# Patient Record
Sex: FEMALE | Race: BLACK OR AFRICAN AMERICAN | NOT HISPANIC OR LATINO | URBAN - METROPOLITAN AREA
[De-identification: names, ages, dates, MRNs, and addresses within clinical notes are randomized per-mention and may not be internally consistent; named-entity substitution may affect disease eponyms.]

---

## 2022-04-04 ENCOUNTER — HOSPITAL ENCOUNTER (OUTPATIENT)
Dept: BEHAVIORAL HEALTH | Age: 27
End: 2022-04-04

## 2022-04-04 ASSESSMENT — COGNITIVE AND FUNCTIONAL STATUS - GENERAL
PERCEPTUAL_MISINTERPRETATIONS_HALLUCINATIONS: VISUAL
MOOD: ANXIOUS
RELIABILITY: APPEARS TO BE TRUTHFUL/RELIABLE
BEHAVIOR: UNABLE TO ASSESS
AFFECT: UNABLE TO ASSESS
COGNITIVE_PROCESS: RACING
ATTENTION_CALCULATED: MAINTAINS ATTENTION
MEMORY: INTACT
INSIGHT: FAIR
ORIENTATION: ORIENTED (PERSON/PLACE/TIME)
LEVEL_OF_CONSCIOUSNESS_CALCULATED: ALERT
MOTOR_BEHAVIOR-AGITATION_CALCULATED: UNABLE TO ASSESS
COGNITIVE_CONTENT: PERSERVATION/RUMINATION
SPEECH: CLEAR/UNDERSTANDABLE
JUDGEMENT: FAIR

## 2022-04-04 ASSESSMENT — LIFESTYLE VARIABLES
ALCOHOL_USE: YES
ALCOHOL_ROUTE: PO
ALCOHOL_USE_STATUS: UNHEALTHY DRINKING IDENTIFIED. AUDIT C: 3 OR MORE FOR WOMEN AND 4 OR MORE FOR MEN.
HOW MANY STANDARD DRINKS CONTAINING ALCOHOL DO YOU HAVE ON A TYPICAL DAY: 7 TO 9
ALCOHOL_TYPE: HARD LIQUOR;WINE
HOW OFTEN DO YOU HAVE 6 OR MORE DRINKS ON ONE OCCASION: DAILY OR ALMOST DAILY
HOW OFTEN DO YOU HAVE A DRINK CONTAINING ALCOHOL: 4 OR MORE TIMES PER WEEK
AUDIT-C TOTAL SCORE: 11

## 2022-04-08 ENCOUNTER — TELEPHONE (OUTPATIENT)
Dept: CASE MANAGEMENT | Age: 27
End: 2022-04-08

## 2022-09-06 ENCOUNTER — OFFICE VISIT (OUTPATIENT)
Dept: OCCUPATIONAL MEDICINE | Age: 27
End: 2022-09-06

## 2022-09-06 DIAGNOSIS — Z11.1 SCREENING FOR TUBERCULOSIS: Primary | ICD-10-CM

## 2022-09-06 DIAGNOSIS — Z02.89 VISIT FOR OCCUPATIONAL HEALTH EXAMINATION: ICD-10-CM

## 2022-09-06 PROCEDURE — 86580 TB INTRADERMAL TEST: CPT | Performed by: PREVENTIVE MEDICINE

## 2022-09-09 ENCOUNTER — OFFICE VISIT (OUTPATIENT)
Dept: OCCUPATIONAL MEDICINE | Age: 27
End: 2022-09-09

## 2022-09-09 DIAGNOSIS — Z00.8 HEALTH EXAMINATION IN POPULATION SURVEY: Primary | ICD-10-CM

## 2022-09-09 LAB
INDURATION: 0 MM (ref 0–?)
SKIN TEST RESULT: NEGATIVE

## 2023-09-17 ENCOUNTER — HOSPITAL ENCOUNTER (EMERGENCY)
Facility: HOSPITAL | Age: 28
Discharge: HOME | End: 2023-09-17

## 2023-09-17 VITALS
SYSTOLIC BLOOD PRESSURE: 112 MMHG | HEART RATE: 69 BPM | BODY MASS INDEX: 21.66 KG/M2 | OXYGEN SATURATION: 99 % | DIASTOLIC BLOOD PRESSURE: 77 MMHG | TEMPERATURE: 98 F | RESPIRATION RATE: 16 BRPM | HEIGHT: 65 IN | WEIGHT: 130 LBS

## 2023-09-17 DIAGNOSIS — J01.00 ACUTE MAXILLARY SINUSITIS, RECURRENCE NOT SPECIFIED: Primary | ICD-10-CM

## 2023-09-17 LAB
FLUAV AG UPPER RESP QL IA.RAPID: NEGATIVE
FLUBV AG UPPER RESP QL IA.RAPID: NEGATIVE
SARS-COV+SARS-COV-2 AG RESP QL IA.RAPID: NEGATIVE

## 2023-09-17 PROCEDURE — 99284 EMERGENCY DEPT VISIT MOD MDM: CPT | Mod: ,,, | Performed by: NURSE PRACTITIONER

## 2023-09-17 PROCEDURE — 99284 PR EMERGENCY DEPT VISIT,LEVEL IV: ICD-10-PCS | Mod: ,,, | Performed by: NURSE PRACTITIONER

## 2023-09-17 PROCEDURE — 87804 INFLUENZA ASSAY W/OPTIC: CPT | Performed by: NURSE PRACTITIONER

## 2023-09-17 PROCEDURE — 99284 EMERGENCY DEPT VISIT MOD MDM: CPT

## 2023-09-17 PROCEDURE — 87426 SARSCOV CORONAVIRUS AG IA: CPT | Performed by: NURSE PRACTITIONER

## 2023-09-17 RX ORDER — FLUTICASONE PROPIONATE 50 MCG
1 SPRAY, SUSPENSION (ML) NASAL 2 TIMES DAILY PRN
Qty: 15 G | Refills: 0 | Status: SHIPPED | OUTPATIENT
Start: 2023-09-17 | End: 2023-12-18

## 2023-09-17 RX ORDER — AMOXICILLIN AND CLAVULANATE POTASSIUM 875; 125 MG/1; MG/1
1 TABLET, FILM COATED ORAL 2 TIMES DAILY
Qty: 20 TABLET | Refills: 0 | Status: SHIPPED | OUTPATIENT
Start: 2023-09-17 | End: 2023-12-18

## 2023-09-17 NOTE — ED PROVIDER NOTES
Encounter Date: 2023       History     Chief Complaint   Patient presents with    Nasal Congestion    Generalized Body Aches    Sore Throat     1 week     29 y/o AAM presents pov with c/o sinus congestion, sore throat, and body aches with onset of 5 days.      Review of patient's allergies indicates:  No Known Allergies  History reviewed. No pertinent past medical history.  Past Surgical History:   Procedure Laterality Date     SECTION       History reviewed. No pertinent family history.  Social History     Tobacco Use    Smoking status: Never    Smokeless tobacco: Never   Substance Use Topics    Alcohol use: Never    Drug use: Never     Review of Systems   Constitutional:  Positive for chills. Negative for fever.   HENT:  Positive for congestion and sinus pressure.    Respiratory:  Negative for apnea, cough, choking, chest tightness, shortness of breath, wheezing and stridor.    Cardiovascular:  Negative for chest pain, palpitations and leg swelling.   Neurological:  Negative for dizziness and headaches.   All other systems reviewed and are negative.      Physical Exam     Initial Vitals [23 1839]   BP Pulse Resp Temp SpO2   112/77 69 16 98.4 °F (36.9 °C) 99 %      MAP       --         Physical Exam    Nursing note and vitals reviewed.  Constitutional: She appears well-developed and well-nourished. No distress.   HENT:   Head: Normocephalic.   Right Ear: Tympanic membrane and ear canal normal.   Left Ear: Tympanic membrane and ear canal normal.   Nose: Mucosal edema present. Right sinus exhibits maxillary sinus tenderness. Right sinus exhibits no frontal sinus tenderness. Left sinus exhibits maxillary sinus tenderness. Left sinus exhibits no frontal sinus tenderness.   Mouth/Throat: Uvula is midline, oropharynx is clear and moist and mucous membranes are normal.   Eyes: Conjunctivae and EOM are normal.   Neck: Neck supple.   Normal range of motion.  Cardiovascular:  Normal rate, regular rhythm,  normal heart sounds and intact distal pulses.     Exam reveals no gallop and no friction rub.       No murmur heard.  Pulmonary/Chest: Breath sounds normal. No respiratory distress. She has no wheezes. She has no rhonchi. She has no rales. She exhibits no tenderness.   Abdominal: Abdomen is soft. Bowel sounds are normal.   Musculoskeletal:         General: Normal range of motion.      Cervical back: Normal range of motion and neck supple.     Neurological: She is alert and oriented to person, place, and time. She has normal strength.   Skin: Skin is warm and dry. Capillary refill takes less than 2 seconds.   Psychiatric: She has a normal mood and affect. Her behavior is normal. Judgment and thought content normal.         Medical Screening Exam   See Full Note    ED Course   Procedures  Labs Reviewed   RAPID INFLUENZA A/B - Normal   SARS ANTIGEN(SANTO) - Normal    Narrative:     Negative SARS-CoV results should not be used as the sole basis for treatment or patient management decisions; negative results should be considered in the context of a patient's recent exposures, history and the presene of clinical signs and symptoms consistent with COVID-19.  Negative results should be treated as presumptive and confirmed by molecular assay, if necessary for patient management.          Imaging Results    None          Medications - No data to display  Medical Decision Making  29 y/o AAM presents pov with c/o sinus congestion, sore throat, and body aches with onset of 5 days.    Amount and/or Complexity of Data Reviewed  Labs: ordered.     Details: Covid: Negative  Flu: Negative                               Clinical Impression:   Final diagnoses:  [J01.00] Acute maxillary sinusitis, recurrence not specified (Primary)        ED Disposition Condition    Discharge Stable          ED Prescriptions       Medication Sig Dispense Start Date End Date Auth. Provider    fluticasone propionate (FLONASE) 50 mcg/actuation nasal spray 1  spray (50 mcg total) by Each Nostril route 2 (two) times daily as needed for Rhinitis. 15 g 9/17/2023 -- Theo Berman FNP    amoxicillin-clavulanate 875-125mg (AUGMENTIN) 875-125 mg per tablet Take 1 tablet by mouth 2 (two) times daily. 20 tablet 9/17/2023 -- Theo Berman FNP          Follow-up Information    None          Theo Berman FNP  09/17/23 9838

## 2023-09-18 NOTE — ED NOTES
Patient discharged to home via private vehicle with discharge instructions and voiced understanding.

## 2023-09-19 ENCOUNTER — HOSPITAL ENCOUNTER (EMERGENCY)
Facility: HOSPITAL | Age: 28
Discharge: HOME | End: 2023-09-19

## 2023-09-19 VITALS
HEART RATE: 89 BPM | SYSTOLIC BLOOD PRESSURE: 102 MMHG | HEIGHT: 65 IN | TEMPERATURE: 98 F | WEIGHT: 131 LBS | BODY MASS INDEX: 21.83 KG/M2 | OXYGEN SATURATION: 98 % | DIASTOLIC BLOOD PRESSURE: 66 MMHG | RESPIRATION RATE: 16 BRPM

## 2023-09-19 DIAGNOSIS — J01.00 ACUTE MAXILLARY SINUSITIS, RECURRENCE NOT SPECIFIED: Primary | ICD-10-CM

## 2023-09-19 PROCEDURE — 99283 PR EMERGENCY DEPT VISIT,LEVEL III: ICD-10-PCS | Mod: ,,, | Performed by: NURSE PRACTITIONER

## 2023-09-19 PROCEDURE — 99283 EMERGENCY DEPT VISIT LOW MDM: CPT | Mod: ,,, | Performed by: NURSE PRACTITIONER

## 2023-09-19 PROCEDURE — 99281 EMR DPT VST MAYX REQ PHY/QHP: CPT

## 2023-09-19 RX ORDER — DEXAMETHASONE SODIUM PHOSPHATE 4 MG/ML
8 INJECTION, SOLUTION INTRA-ARTICULAR; INTRALESIONAL; INTRAMUSCULAR; INTRAVENOUS; SOFT TISSUE
Status: DISCONTINUED | OUTPATIENT
Start: 2023-09-19 | End: 2023-09-19 | Stop reason: HOSPADM

## 2023-09-19 NOTE — ED PROVIDER NOTES
Encounter Date: 2023       History     Chief Complaint   Patient presents with    Nasal Congestion    Cough     27 yo AAF seen 2 days ago for sinus infection. Was unable to afford antibiotics until today so she just started them. 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 Declined Decadron injection.     The history is provided by the patient.     Review of patient's allergies indicates:  No Known Allergies  History reviewed. No pertinent past medical history.  Past Surgical History:   Procedure Laterality Date     SECTION       History reviewed. No pertinent family history.  Social History     Tobacco Use    Smoking status: Never    Smokeless tobacco: Never   Substance Use Topics    Alcohol use: Never    Drug use: Never     Review of Systems   Constitutional:  Negative for appetite change, chills and fever.   HENT:  Positive for congestion, sinus pressure and sinus pain. Negative for sneezing and sore throat.    Respiratory:  Negative for cough, shortness of breath and wheezing.    Cardiovascular:  Negative for chest pain, palpitations and leg swelling.   Gastrointestinal:  Negative for abdominal pain, nausea and vomiting.   Musculoskeletal:  Negative for back pain, neck pain and neck stiffness.   Neurological:  Positive for headaches. Negative for dizziness and weakness.   Psychiatric/Behavioral:  Positive for confusion.    All other systems reviewed and are negative.      Physical Exam     Initial Vitals [23 1847]   BP Pulse Resp Temp SpO2   102/66 89 16 98.4 °F (36.9 °C) 98 %      MAP       --         Physical Exam    Nursing note and vitals reviewed.  Constitutional: She appears well-developed and well-nourished.   HENT:   Head: Normocephalic and atraumatic.   Right Ear: Hearing, tympanic membrane, external ear and ear canal normal.   Left Ear: Hearing, tympanic membrane, external ear and ear canal normal.   Nose: Mucosal edema and sinus tenderness present. Right sinus exhibits  maxillary sinus tenderness. Left sinus exhibits maxillary sinus tenderness.   Eyes: EOM are normal. Pupils are equal, round, and reactive to light.   Neck: Neck supple.   Normal range of motion.  Cardiovascular:  Normal rate, regular rhythm and normal heart sounds.           Pulmonary/Chest: Breath sounds normal.   Abdominal: Abdomen is soft. Bowel sounds are normal.   Musculoskeletal:         General: Normal range of motion.      Cervical back: Normal range of motion and neck supple.     Neurological: She is alert and oriented to person, place, and time. She has normal strength. GCS score is 15. GCS eye subscore is 4. GCS verbal subscore is 5. GCS motor subscore is 6.   Skin: Skin is warm. Capillary refill takes less than 2 seconds.   Psychiatric: She has a normal mood and affect. Her behavior is normal. Judgment and thought content normal.         Medical Screening Exam   See Full Note    ED Course   Procedures  Labs Reviewed - No data to display       Imaging Results    None          Medications   dexAMETHasone injection 8 mg (8 mg Intramuscular Not Given 9/19/23 1900)     Medical Decision Making  27 yo AAF seen 2 days ago for sinus infection. Was unable to afford antibiotics until today so she just started them. 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 Declined Decadron injection.     Patient instructed to continue antibiotics.   Vitals reviewed: /66, T98.4, P89    Strict return and follow-up precautions have been given by me personally to the patient/family/caregiver(s).    Data Reviewed/Counseling: I have reviewed the patient's vital signs, nursing notes, and other relevant tests/information. I had a detailed discussion regarding the historical points, exam findings, and any diagnostic results supporting the discharge diagnosis. I also discussed the need for outpatient follow-up and the need to return to the ED if symptoms worsen or if there are any questions or concerns that arise at home.         Risk  Prescription drug management.                               Clinical Impression:   Final diagnoses:  [J01.00] Acute maxillary sinusitis, recurrence not specified (Primary)        ED Disposition Condition    Discharge Stable          ED Prescriptions    None       Follow-up Information    None          Regina Dotson, SHILO  09/19/23 4532

## 2023-09-19 NOTE — DISCHARGE INSTRUCTIONS
Take medication as prescribed. Drink plenty of fluids. Follow up with your primary care provider as needed. Return to the ER for worsening condition.     The examination and treatment you have received in the Emergency Department today have been rendered on an emergency basis only and are not intended to be a substitute for an effort to provide complete medical care. You should contact your follow-up physician as it is important that you let him or her check you and report any new or remaining problems since it is impossible to recognize and treat all elements of an injury or illness in a single emergency care center visit.

## 2023-09-19 NOTE — Clinical Note
"Lucinda Slaterle" Odin was seen and treated in our emergency department on 9/19/2023.  She may return to work on 09/21/2023.       If you have any questions or concerns, please don't hesitate to call.      Regina Dotson, SHILO"

## 2023-11-19 ENCOUNTER — HOSPITAL ENCOUNTER (EMERGENCY)
Facility: HOSPITAL | Age: 28
Discharge: HOME | End: 2023-11-19

## 2023-11-19 VITALS
BODY MASS INDEX: 22.72 KG/M2 | HEART RATE: 77 BPM | RESPIRATION RATE: 16 BRPM | WEIGHT: 136.38 LBS | SYSTOLIC BLOOD PRESSURE: 109 MMHG | TEMPERATURE: 99 F | OXYGEN SATURATION: 97 % | HEIGHT: 65 IN | DIASTOLIC BLOOD PRESSURE: 62 MMHG

## 2023-11-19 DIAGNOSIS — R07.9 CHEST PAIN: ICD-10-CM

## 2023-11-19 DIAGNOSIS — J10.1 INFLUENZA B: Primary | ICD-10-CM

## 2023-11-19 DIAGNOSIS — F19.10 SUBSTANCE ABUSE: ICD-10-CM

## 2023-11-19 LAB
ALBUMIN SERPL BCP-MCNC: 3.8 G/DL (ref 3.5–5)
ALBUMIN/GLOB SERPL: 0.9 {RATIO}
ALP SERPL-CCNC: 48 U/L (ref 37–98)
ALT SERPL W P-5'-P-CCNC: 27 U/L (ref 13–56)
ANION GAP SERPL CALCULATED.3IONS-SCNC: 17 MMOL/L (ref 7–16)
AST SERPL W P-5'-P-CCNC: 22 U/L (ref 15–37)
BASOPHILS # BLD AUTO: 0.02 K/UL (ref 0–0.2)
BASOPHILS NFR BLD AUTO: 0.5 % (ref 0–1)
BILIRUB SERPL-MCNC: 0.1 MG/DL (ref ?–1.2)
BUN SERPL-MCNC: 13 MG/DL (ref 7–18)
BUN/CREAT SERPL: 21 (ref 6–20)
CALCIUM SERPL-MCNC: 8.7 MG/DL (ref 8.5–10.1)
CHLORIDE SERPL-SCNC: 101 MMOL/L (ref 98–107)
CO2 SERPL-SCNC: 25 MMOL/L (ref 21–32)
CREAT SERPL-MCNC: 0.62 MG/DL (ref 0.55–1.02)
DIFFERENTIAL METHOD BLD: ABNORMAL
EGFR (NO RACE VARIABLE) (RUSH/TITUS): 125 ML/MIN/1.73M2
EOSINOPHIL # BLD AUTO: 0.04 K/UL (ref 0–0.5)
EOSINOPHIL NFR BLD AUTO: 0.9 % (ref 1–4)
ERYTHROCYTE [DISTWIDTH] IN BLOOD BY AUTOMATED COUNT: 12.1 % (ref 11.5–14.5)
FLUAV AG UPPER RESP QL IA.RAPID: NEGATIVE
FLUBV AG UPPER RESP QL IA.RAPID: POSITIVE
GLOBULIN SER-MCNC: 4.1 G/DL (ref 2–4)
GLUCOSE SERPL-MCNC: 92 MG/DL (ref 74–106)
HCT VFR BLD AUTO: 33.6 % (ref 38–47)
HGB BLD-MCNC: 11.1 G/DL (ref 12–16)
LACTATE SERPL-SCNC: 1.6 MMOL/L (ref 0.4–2)
LYMPHOCYTES # BLD AUTO: 1.57 K/UL (ref 1–4.8)
LYMPHOCYTES NFR BLD AUTO: 36.9 % (ref 27–41)
MAGNESIUM SERPL-MCNC: 1.7 MG/DL (ref 1.7–2.3)
MCH RBC QN AUTO: 30.5 PG (ref 27–31)
MCHC RBC AUTO-ENTMCNC: 33 G/DL (ref 32–36)
MCV RBC AUTO: 92.3 FL (ref 80–96)
MONOCYTES # BLD AUTO: 0.33 K/UL (ref 0–0.8)
MONOCYTES NFR BLD AUTO: 7.7 % (ref 2–6)
MPC BLD CALC-MCNC: 9.6 FL (ref 9.4–12.4)
NEUTROPHILS # BLD AUTO: 2.3 K/UL (ref 1.8–7.7)
NEUTROPHILS NFR BLD AUTO: 54 % (ref 53–65)
PLATELET # BLD AUTO: 216 K/UL (ref 150–400)
POTASSIUM SERPL-SCNC: 3.5 MMOL/L (ref 3.5–5.1)
PROT SERPL-MCNC: 7.9 G/DL (ref 6.4–8.2)
RBC # BLD AUTO: 3.64 M/UL (ref 4.2–5.4)
SARS-COV-2 RDRP RESP QL NAA+PROBE: NEGATIVE
SODIUM SERPL-SCNC: 139 MMOL/L (ref 136–145)
TROPONIN I SERPL DL<=0.01 NG/ML-MCNC: <4 PG/ML
WBC # BLD AUTO: 4.26 K/UL (ref 4.5–11)

## 2023-11-19 PROCEDURE — 96374 THER/PROPH/DIAG INJ IV PUSH: CPT

## 2023-11-19 PROCEDURE — 93005 ELECTROCARDIOGRAM TRACING: CPT

## 2023-11-19 PROCEDURE — 99284 PR EMERGENCY DEPT VISIT,LEVEL IV: ICD-10-PCS | Mod: ,,,

## 2023-11-19 PROCEDURE — 63600175 PHARM REV CODE 636 W HCPCS

## 2023-11-19 PROCEDURE — 85025 COMPLETE CBC W/AUTO DIFF WBC: CPT

## 2023-11-19 PROCEDURE — 93010 ELECTROCARDIOGRAM REPORT: CPT | Mod: ,,, | Performed by: INTERNAL MEDICINE

## 2023-11-19 PROCEDURE — 83735 ASSAY OF MAGNESIUM: CPT

## 2023-11-19 PROCEDURE — 80053 COMPREHEN METABOLIC PANEL: CPT

## 2023-11-19 PROCEDURE — 87804 INFLUENZA ASSAY W/OPTIC: CPT

## 2023-11-19 PROCEDURE — 99285 EMERGENCY DEPT VISIT HI MDM: CPT | Mod: 25

## 2023-11-19 PROCEDURE — 87635 SARS-COV-2 COVID-19 AMP PRB: CPT

## 2023-11-19 PROCEDURE — 99284 EMERGENCY DEPT VISIT MOD MDM: CPT | Mod: ,,,

## 2023-11-19 PROCEDURE — 84484 ASSAY OF TROPONIN QUANT: CPT

## 2023-11-19 PROCEDURE — 83605 ASSAY OF LACTIC ACID: CPT

## 2023-11-19 PROCEDURE — 93010 EKG 12-LEAD: ICD-10-PCS | Mod: ,,, | Performed by: INTERNAL MEDICINE

## 2023-11-19 RX ORDER — ONDANSETRON 2 MG/ML
4 INJECTION INTRAMUSCULAR; INTRAVENOUS
Status: COMPLETED | OUTPATIENT
Start: 2023-11-19 | End: 2023-11-19

## 2023-11-19 RX ADMIN — ONDANSETRON 4 MG: 2 INJECTION INTRAMUSCULAR; INTRAVENOUS at 08:11

## 2023-11-19 NOTE — Clinical Note
"Lucinda Slaterle" Odin was seen and treated in our emergency department on 11/19/2023.  She may return to work on 11/20/2023.       If you have any questions or concerns, please don't hesitate to call.      Feng Mendoza, SHILO"

## 2023-11-20 NOTE — ED PROVIDER NOTES
"Encounter Date: 2023       History     Chief Complaint   Patient presents with    Cough     Pt states, snorted cocaine last night for the first time, pt was to embarrassed to say anything. Chest hurts on lt side.       Patient is a 28-year-old  female with no significant past medical history presents to the emergency department POV with complaint of chest pain.  Patient stated that her chest pain started this evening while patient was lying sown to go to sleep. Patient endorsed cocaine usage last night, stated that it was her first time. Patient denies any radiation of pain, chest pain is not reproducible upon physical exam. Patient does endorse nausea without vomiting.       Chest Pain  The current episode started 2 to 3 hours ago. Chest pain occurs constantly. The chest pain is unchanged. The pain is associated with breathing. At its most intense, the chest pain is at 6/10. The chest pain is currently at 6/10. The quality of the pain is described as aching and dull. The pain does not radiate. Primary symptoms include cough and nausea. Pertinent negatives for primary symptoms include no fever, no fatigue, no syncope, no shortness of breath, no wheezing, no palpitations, no abdominal pain, no vomiting, no dizziness and no altered mental status.     Review of patient's allergies indicates:  No Known Allergies  Past Medical History:   Diagnosis Date    Fibroids      Past Surgical History:   Procedure Laterality Date     SECTION       History reviewed. No pertinent family history.  Social History     Tobacco Use    Smoking status: Every Day     Types: Cigarettes, Vaping with nicotine    Smokeless tobacco: Current   Substance Use Topics    Alcohol use: Yes     Comment: socially    Drug use: Yes     Frequency: 1.0 times per week     Types: "Crack" cocaine     Comment: started last night     Review of Systems   Constitutional: Negative.  Negative for fatigue and fever.   Eyes: Negative.  "   Respiratory:  Positive for cough. Negative for shortness of breath and wheezing.    Cardiovascular:  Positive for chest pain. Negative for palpitations and syncope.   Gastrointestinal:  Positive for nausea. Negative for abdominal pain and vomiting.   Endocrine: Negative.    Genitourinary: Negative.    Musculoskeletal: Negative.    Skin: Negative.    Allergic/Immunologic: Negative.    Neurological: Negative.  Negative for dizziness.   Hematological: Negative.    Psychiatric/Behavioral: Negative.         Physical Exam     Initial Vitals [11/19/23 2037]   BP Pulse Resp Temp SpO2   131/77 95 18 98.5 °F (36.9 °C) 97 %      MAP       --         Physical Exam    Nursing note and vitals reviewed.  Constitutional: Vital signs are normal. She appears well-developed and well-nourished. She is not diaphoretic. She is cooperative.  Non-toxic appearance. She does not have a sickly appearance. She does not appear ill. No distress.   Cardiovascular:  Normal rate, regular rhythm, S1 normal, S2 normal, normal heart sounds, intact distal pulses and normal pulses.     Exam reveals no gallop, no S3, no S4, no distant heart sounds and no friction rub.       No murmur heard.  No systolic murmur is present.  No diastolic murmur is present.  Pulmonary/Chest: Effort normal and breath sounds normal.   Abdominal: Abdomen is soft and flat. Bowel sounds are normal. There is no abdominal tenderness. No hernia.     Lymphadenopathy:     She has no cervical adenopathy.     She has no axillary adenopathy.   Neurological: She is alert and oriented to person, place, and time. She has normal strength and normal reflexes. She displays normal reflexes. No cranial nerve deficit or sensory deficit. She displays a negative Romberg sign. GCS eye subscore is 4. GCS verbal subscore is 5. GCS motor subscore is 6.   Skin: Skin is warm, dry and intact. Capillary refill takes less than 2 seconds. No rash noted.   Psychiatric: She has a normal mood and affect. Her  speech is normal and behavior is normal. Judgment and thought content normal. She is not actively hallucinating. Cognition and memory are normal. She is attentive.         Medical Screening Exam   See Full Note    ED Course   Procedures  Labs Reviewed   RAPID INFLUENZA A/B - Abnormal; Notable for the following components:       Result Value    Influenza B Positive (*)     All other components within normal limits   COMPREHENSIVE METABOLIC PANEL - Abnormal; Notable for the following components:    Anion Gap 17 (*)     BUN/Creatinine Ratio 21 (*)     Globulin 4.1 (*)     All other components within normal limits   CBC WITH DIFFERENTIAL - Abnormal; Notable for the following components:    WBC 4.26 (*)     RBC 3.64 (*)     Hemoglobin 11.1 (*)     Hematocrit 33.6 (*)     Monocytes % 7.7 (*)     Eosinophils % 0.9 (*)     All other components within normal limits   SARS-COV-2 RNA AMPLIFICATION, QUAL - Normal    Narrative:     Negative SARS-CoV results should not be used as the sole basis for treatment or patient management decisions; negative results should be considered in the context of a patient's recent exposures, history and the presene of clinical signs and symptoms consistent with COVID-19.  Negative results should be treated as presumptive and confirmed by molecular assay, if necessary for patient management.   TROPONIN I - Normal   MAGNESIUM - Normal   LACTIC ACID, PLASMA - Normal   CBC W/ AUTO DIFFERENTIAL    Narrative:     The following orders were created for panel order CBC auto differential.  Procedure                               Abnormality         Status                     ---------                               -----------         ------                     CBC with Differential[[id number]]       Abnormal            Final result                 Please view results for these tests on the individual orders.   URINALYSIS, REFLEX TO URINE CULTURE   HCG QUALITATIVE URINE   DRUG SCREEN, URINE (BEAKER)     EKG  Readings: (Independently Interpreted)   Initial Reading: No STEMI. Rhythm: Normal Sinus Rhythm. Heart Rate: 75. Ectopy: No Ectopy. Conduction: Normal. ST Segments: Normal ST Segments. T Waves: Normal. Axis: Normal. Clinical Impression: Normal Sinus Rhythm       Imaging Results              X-Ray Chest 1 View (Final result)  Result time 11/19/23 21:02:05      Final result by Barrington Montez MD (11/19/23 21:02:05)                   Impression:      No acute cardiopulmonary process.    Place of service: [facility]      Electronically signed by: Barrington Montez  Date:    11/19/2023  Time:    21:02               Narrative:    EXAMINATION:  XR CHEST 1 VIEW    CLINICAL HISTORY:  Chest pain, unspecified    TECHNIQUE:  Chest one view    COMPARISON:  None    FINDINGS:  The cardiomediastinal silhouette is within normal limits. The lungs are clear. There is no pneumothorax or pleural effusion.    There is no acute osseous or soft tissue abnormality.                                       Medications   ondansetron injection 4 mg (4 mg Intravenous Given 11/19/23 2058)     Medical Decision Making  Patient is a 28-year-old  female with no significant past medical history presents to the emergency department POV with complaint of chest pain.  Patient stated that her chest pain started this evening while patient was lying sown to go to sleep. Patient endorsed cocaine usage last night, stated that it was her first time. Patient denies any radiation of pain, chest pain is not reproducible upon physical exam. Patient does endorse nausea without vomiting.       Chest Pain  The current episode started 2 to 3 hours ago. Chest pain occurs constantly. The chest pain is unchanged. The pain is associated with breathing. At its most intense, the chest pain is at 6/10. The chest pain is currently at 6/10. The quality of the pain is described as aching and dull. The pain does not radiate. Primary symptoms include  cough and nausea. Pertinent negatives for primary symptoms include no fever, no fatigue, no syncope, no shortness of breath, no wheezing, no palpitations, no abdominal pain, no vomiting, no dizziness and no altered mental status.       Amount and/or Complexity of Data Reviewed  Labs: ordered.  Radiology: ordered.    Risk  Prescription drug management.  Risk Details: Risk of cardiac event with drug usage discussed.       Additional MDM:   Heart Score:    History:          Slightly suspicious.  ECG:             Normal  Age:               Less than 45 years  Risk factors: no risk factors known  Troponin:       Less than or equal to normal limit  Heart Score = 0                ED Course as of 11/19/23 2123   [place] Nov 19, 2023 2120 Lab results reviewed by me and discussed with patient, discharge instructions given along with strict return precautions patient verbalizes understanding. [AC]      ED Course User Index  [AC] Feng Mendoza FNP                        Clinical Impression:   Final diagnoses:  [R07.9] Chest pain  [J10.1] Influenza B (Primary)  [F19.10] Substance abuse        ED Disposition Condition    Discharge Stable          ED Prescriptions    None       Follow-up Information       Follow up With Specialties Details Why Contact Info    local pcp   As needed, If symptoms worsen              Feng Mendoza FNP  11/19/23 2123

## 2023-11-20 NOTE — DISCHARGE INSTRUCTIONS
Tylenol and or Motrin as needed for pain or fever, push plenty of fluids, follow up with your local provider if symptoms continue, return to the ER if symptoms worsened.    The examination and treatment you have received in the Emergency Department today have been rendered on an emergency basis only and are not intended to be a substitute for an effort to provide complete medical care. You should contact your follow-up physician as it is important that you let him or her check you and report any new or remaining problems since it is impossible to recognize and treat all elements of an injury or illness in a single emergency care center visit.

## 2023-12-18 ENCOUNTER — HOSPITAL ENCOUNTER (EMERGENCY)
Facility: HOSPITAL | Age: 28
Discharge: HOME | End: 2023-12-18

## 2023-12-18 VITALS
HEIGHT: 65 IN | HEART RATE: 87 BPM | TEMPERATURE: 98 F | WEIGHT: 133.13 LBS | OXYGEN SATURATION: 98 % | DIASTOLIC BLOOD PRESSURE: 94 MMHG | SYSTOLIC BLOOD PRESSURE: 116 MMHG | BODY MASS INDEX: 22.18 KG/M2 | RESPIRATION RATE: 16 BRPM

## 2023-12-18 DIAGNOSIS — R07.89 CHEST WALL PAIN: ICD-10-CM

## 2023-12-18 DIAGNOSIS — R07.9 CHEST PAIN: ICD-10-CM

## 2023-12-18 DIAGNOSIS — F41.9 ANXIETY: Primary | ICD-10-CM

## 2023-12-18 LAB
ALBUMIN SERPL BCP-MCNC: 3.8 G/DL (ref 3.5–5)
ALBUMIN/GLOB SERPL: 1 {RATIO}
ALP SERPL-CCNC: 44 U/L (ref 37–98)
ALT SERPL W P-5'-P-CCNC: 21 U/L (ref 13–56)
ANION GAP SERPL CALCULATED.3IONS-SCNC: 15 MMOL/L (ref 7–16)
AST SERPL W P-5'-P-CCNC: 20 U/L (ref 15–37)
BASOPHILS # BLD AUTO: 0.02 K/UL (ref 0–0.2)
BASOPHILS NFR BLD AUTO: 0.3 % (ref 0–1)
BILIRUB SERPL-MCNC: 0.5 MG/DL (ref ?–1.2)
BUN SERPL-MCNC: 15 MG/DL (ref 7–18)
BUN/CREAT SERPL: 21 (ref 6–20)
CALCIUM SERPL-MCNC: 8.7 MG/DL (ref 8.5–10.1)
CHLORIDE SERPL-SCNC: 103 MMOL/L (ref 98–107)
CO2 SERPL-SCNC: 24 MMOL/L (ref 21–32)
CREAT SERPL-MCNC: 0.7 MG/DL (ref 0.55–1.02)
DIFFERENTIAL METHOD BLD: ABNORMAL
EGFR (NO RACE VARIABLE) (RUSH/TITUS): 121 ML/MIN/1.73M2
EOSINOPHIL # BLD AUTO: 0.08 K/UL (ref 0–0.5)
EOSINOPHIL NFR BLD AUTO: 1.1 % (ref 1–4)
ERYTHROCYTE [DISTWIDTH] IN BLOOD BY AUTOMATED COUNT: 12 % (ref 11.5–14.5)
GLOBULIN SER-MCNC: 4 G/DL (ref 2–4)
GLUCOSE SERPL-MCNC: 83 MG/DL (ref 74–106)
HCT VFR BLD AUTO: 36.2 % (ref 38–47)
HGB BLD-MCNC: 12 G/DL (ref 12–16)
LYMPHOCYTES # BLD AUTO: 1.25 K/UL (ref 1–4.8)
LYMPHOCYTES NFR BLD AUTO: 16.6 % (ref 27–41)
MCH RBC QN AUTO: 30.6 PG (ref 27–31)
MCHC RBC AUTO-ENTMCNC: 33.1 G/DL (ref 32–36)
MCV RBC AUTO: 92.3 FL (ref 80–96)
MONOCYTES # BLD AUTO: 0.21 K/UL (ref 0–0.8)
MONOCYTES NFR BLD AUTO: 2.8 % (ref 2–6)
MPC BLD CALC-MCNC: 9.8 FL (ref 9.4–12.4)
NEUTROPHILS # BLD AUTO: 5.98 K/UL (ref 1.8–7.7)
NEUTROPHILS NFR BLD AUTO: 79.2 % (ref 53–65)
PLATELET # BLD AUTO: 203 K/UL (ref 150–400)
POTASSIUM SERPL-SCNC: 3.8 MMOL/L (ref 3.5–5.1)
PROT SERPL-MCNC: 7.8 G/DL (ref 6.4–8.2)
RBC # BLD AUTO: 3.92 M/UL (ref 4.2–5.4)
SODIUM SERPL-SCNC: 138 MMOL/L (ref 136–145)
TROPONIN I SERPL DL<=0.01 NG/ML-MCNC: <4 PG/ML
WBC # BLD AUTO: 7.54 K/UL (ref 4.5–11)

## 2023-12-18 PROCEDURE — 93010 EKG 12-LEAD: ICD-10-PCS | Mod: ,,, | Performed by: INTERNAL MEDICINE

## 2023-12-18 PROCEDURE — 93010 ELECTROCARDIOGRAM REPORT: CPT | Mod: ,,, | Performed by: INTERNAL MEDICINE

## 2023-12-18 PROCEDURE — 99284 EMERGENCY DEPT VISIT MOD MDM: CPT | Mod: ,,, | Performed by: NURSE PRACTITIONER

## 2023-12-18 PROCEDURE — 80053 COMPREHEN METABOLIC PANEL: CPT | Performed by: NURSE PRACTITIONER

## 2023-12-18 PROCEDURE — 85025 COMPLETE CBC W/AUTO DIFF WBC: CPT | Performed by: NURSE PRACTITIONER

## 2023-12-18 PROCEDURE — 99284 PR EMERGENCY DEPT VISIT,LEVEL IV: ICD-10-PCS | Mod: ,,, | Performed by: NURSE PRACTITIONER

## 2023-12-18 PROCEDURE — 99285 EMERGENCY DEPT VISIT HI MDM: CPT | Mod: 25

## 2023-12-18 PROCEDURE — 93005 ELECTROCARDIOGRAM TRACING: CPT

## 2023-12-18 PROCEDURE — 84484 ASSAY OF TROPONIN QUANT: CPT | Performed by: NURSE PRACTITIONER

## 2023-12-18 RX ORDER — TRAZODONE HYDROCHLORIDE 150 MG/1
150 TABLET ORAL
COMMUNITY
Start: 2023-11-23

## 2023-12-18 RX ORDER — SERTRALINE HYDROCHLORIDE 100 MG/1
100 TABLET, FILM COATED ORAL
COMMUNITY
Start: 2023-11-23 | End: 2023-12-18

## 2023-12-18 RX ORDER — SERTRALINE HYDROCHLORIDE 100 MG/1
100 TABLET, FILM COATED ORAL DAILY
Qty: 30 TABLET | Refills: 0 | Status: SHIPPED | OUTPATIENT
Start: 2023-12-18

## 2023-12-18 RX ORDER — LORAZEPAM 1 MG/1
1 TABLET ORAL
COMMUNITY
Start: 2023-11-23

## 2023-12-18 NOTE — Clinical Note
"Lucinda Slaterle" Odin was seen and treated in our emergency department on 12/18/2023.  She may return to work on 12/19/2023.       If you have any questions or concerns, please don't hesitate to call.      Dav Guadarrama, FNP"

## 2023-12-18 NOTE — ED PROVIDER NOTES
"Encounter Date: 2023       History     Chief Complaint   Patient presents with    Chest Pain     Since 0200 this am. Reports shortness of breath. States she has history of anxiety attacks and takes medication to sleep. States her daughter poured her medications "down the drain" so she was without it last night.      Chest pain that is with movement or deep breath.  Started 4 hours ago.  Pt has hx of anxiety and ran out of zoloft, states she hasn't been able to see pcp because in process of getting medicaid.  Denies cocaine use since last month.   Did take ectasy on Friday.     The history is provided by the patient.     Review of patient's allergies indicates:  No Known Allergies  Past Medical History:   Diagnosis Date    Fibroids      Past Surgical History:   Procedure Laterality Date     SECTION       History reviewed. No pertinent family history.  Social History     Tobacco Use    Smoking status: Every Day     Types: Cigarettes, Vaping with nicotine    Smokeless tobacco: Current   Substance Use Topics    Alcohol use: Yes     Comment: socially    Drug use: Yes     Frequency: 1.0 times per week     Types: "Crack" cocaine     Comment: started last night     Review of Systems   Respiratory:  Positive for chest tightness.    Cardiovascular:  Positive for chest pain.   All other systems reviewed and are negative.      Physical Exam     Initial Vitals [23 0710]   BP Pulse Resp Temp SpO2   (!) 116/94 87 16 98 °F (36.7 °C) 98 %      MAP       --         Physical Exam    Constitutional: She appears well-developed and well-nourished.   HENT:   Head: Normocephalic.   Right Ear: External ear normal.   Left Ear: External ear normal.   Nose: Nose normal.   Mouth/Throat: Oropharynx is clear and moist.   Eyes: EOM are normal. Pupils are equal, round, and reactive to light.   Neck:   Normal range of motion.  Cardiovascular:  Normal rate, regular rhythm and normal heart sounds.           Pulmonary/Chest: Breath " sounds normal.   Abdominal: Abdomen is soft.   Musculoskeletal:         General: Normal range of motion.      Cervical back: Normal range of motion.     Neurological: She is alert and oriented to person, place, and time.   Skin: Skin is warm.   Psychiatric: She has a normal mood and affect.         Medical Screening Exam   See Full Note    ED Course   Procedures  Labs Reviewed   COMPREHENSIVE METABOLIC PANEL - Abnormal; Notable for the following components:       Result Value    BUN/Creatinine Ratio 21 (*)     All other components within normal limits   CBC WITH DIFFERENTIAL - Abnormal; Notable for the following components:    RBC 3.92 (*)     Hematocrit 36.2 (*)     Neutrophils % 79.2 (*)     Lymphocytes % 16.6 (*)     All other components within normal limits   TROPONIN I - Normal   CBC W/ AUTO DIFFERENTIAL    Narrative:     The following orders were created for panel order CBC auto differential.  Procedure                               Abnormality         Status                     ---------                               -----------         ------                     CBC with Differential[[id number]]       Abnormal            Final result                 Please view results for these tests on the individual orders.     EKG Readings: (Independently Interpreted)   Rhythm: Normal Sinus Rhythm. Ectopy: No Ectopy. Conduction: Normal. ST Segments: Normal ST Segments. T Waves: Normal. Clinical Impression: Normal Sinus Rhythm       Imaging Results              X-Ray Chest 1 View (In process)                   X-Rays:   Independently Interpreted Readings:   Chest X-Ray: Normal heart size.  No infiltrates.  No acute abnormalities.     Medications - No data to display  Medical Decision Making  Chest pain that is with movement or deep breath.  Started 4 hours ago.  Pt has hx of anxiety and ran out of zoloft, states she hasn't been able to see pcp because in process of getting medicaid.  Denies cocaine use since last month.    Did take ectasy on Friday.     Amount and/or Complexity of Data Reviewed  Labs: ordered.  Radiology: ordered.    Risk  Prescription drug management.                                      Clinical Impression:   Final diagnoses:  [R07.89] Chest wall pain  [F41.9] Anxiety (Primary)        ED Disposition Condition    Discharge Stable        one month rx given for zoloft.  Instructed pt she must find a pcp to get this refilled.    ED Prescriptions       Medication Sig Dispense Start Date End Date Auth. Provider    sertraline (ZOLOFT) 100 MG tablet Take 1 tablet (100 mg total) by mouth once daily. 30 tablet 12/18/2023 -- Dav Guadarrama FNP          Follow-up Information       Follow up With Specialties Details Why Contact Info    pcp  In 1 week               Dav Guadarrama FNP  12/18/23 1610       Dav Guadarrama FNP  12/18/23 0800

## 2023-12-23 ENCOUNTER — HOSPITAL ENCOUNTER (EMERGENCY)
Facility: HOSPITAL | Age: 28
Discharge: HOME | End: 2023-12-23

## 2023-12-23 VITALS
WEIGHT: 136 LBS | OXYGEN SATURATION: 97 % | TEMPERATURE: 98 F | HEART RATE: 95 BPM | BODY MASS INDEX: 22.66 KG/M2 | HEIGHT: 65 IN | DIASTOLIC BLOOD PRESSURE: 67 MMHG | RESPIRATION RATE: 16 BRPM | SYSTOLIC BLOOD PRESSURE: 109 MMHG

## 2023-12-23 DIAGNOSIS — G47.00 INSOMNIA, UNSPECIFIED TYPE: Primary | ICD-10-CM

## 2023-12-23 PROCEDURE — 99284 EMERGENCY DEPT VISIT MOD MDM: CPT | Mod: ,,,

## 2023-12-23 PROCEDURE — 99284 PR EMERGENCY DEPT VISIT,LEVEL IV: ICD-10-PCS | Mod: ,,,

## 2023-12-23 PROCEDURE — 99283 EMERGENCY DEPT VISIT LOW MDM: CPT

## 2023-12-23 RX ORDER — QUETIAPINE FUMARATE 50 MG/1
50 TABLET, FILM COATED ORAL NIGHTLY
Qty: 30 TABLET | Refills: 0 | Status: SHIPPED | OUTPATIENT
Start: 2023-12-23 | End: 2024-12-22

## 2023-12-23 RX ORDER — OLANZAPINE 10 MG/1
10 TABLET, ORALLY DISINTEGRATING ORAL NIGHTLY
COMMUNITY
Start: 2023-12-21

## 2023-12-23 NOTE — DISCHARGE INSTRUCTIONS
Take medication as direct by the label on the bottle, follow up with psychiatrists for follow up, return to the ER as needed if symptoms worsen.     The examination and treatment you have received in the Emergency Department today have been rendered on an emergency basis only and are not intended to be a substitute for an effort to provide complete medical care. You should contact your follow-up physician as it is important that you let him or her check you and report any new or remaining problems since it is impossible to recognize and treat all elements of an injury or illness in a single emergency care center visit.       Talt[facility] Counseling: I discussed with the patient the risks of ixekizumab including but not limited to immunosuppression, serious infections, worsening of inflammatory bowel disease and drug reactions.  The patient understands that monitoring is required including a PPD at baseline and must alert us or the primary physician if symptoms of infection or other concerning signs are noted.

## 2023-12-23 NOTE — ED PROVIDER NOTES
"Encounter Date: 2023       History     Chief Complaint   Patient presents with    Insomnia     X 1week     Patient is a 27 y/o female with a PMHx significant for Depression and Anxiety presents ambulatory via POV to the Emergency Department with c/o insomnia. Patient stated that she was recently discharged from the [facility] in [place] for Depression and Anxiety. Patient stated that she has not been able to sleep at night since her discharge. Patient was receiving Seroquel while inpatient and discharged home on Trazodone.     The history is provided by the patient.     Review of patient's allergies indicates:  No Known Allergies  Past Medical History:   Diagnosis Date    Anxiety disorder, unspecified     Fibroids     Insomnia      Past Surgical History:   Procedure Laterality Date     SECTION       History reviewed. No pertinent family history.  Social History     Tobacco Use    Smoking status: Every Day     Types: Cigarettes, Vaping with nicotine    Smokeless tobacco: Current   Substance Use Topics    Alcohol use: Yes     Comment: socially    Drug use: Yes     Frequency: 1.0 times per week     Types: "Crack" cocaine     Comment: started last night     Review of Systems   Constitutional: Negative.    HENT: Negative.     Eyes: Negative.    Respiratory: Negative.     Cardiovascular: Negative.    Gastrointestinal: Negative.    Endocrine: Negative.    Genitourinary: Negative.    Musculoskeletal: Negative.    Skin:  Positive for color change.   Allergic/Immunologic: Negative.    Neurological: Negative.    Hematological: Negative.    Psychiatric/Behavioral:  Positive for sleep disturbance. Negative for agitation, behavioral problems, confusion, decreased concentration, dysphoric mood, hallucinations, self-injury and suicidal ideas. The patient is not nervous/anxious and is not hyperactive.        Physical Exam     Initial Vitals [23 1139]   BP Pulse Resp Temp SpO2   109/67 95 16 98.1 °F " (36.7 °C) 97 %      MAP       --         Physical Exam    Nursing note and vitals reviewed.  Constitutional: Vital signs are normal. She appears well-developed and well-nourished. She is not diaphoretic. She is cooperative.  Non-toxic appearance. She does not have a sickly appearance. She does not appear ill. No distress.   Cardiovascular:  Normal rate, regular rhythm, S1 normal, S2 normal, normal heart sounds, intact distal pulses and normal pulses.     Exam reveals no gallop, no S3, no S4, no distant heart sounds and no friction rub.       No murmur heard.  No systolic murmur is present.  No diastolic murmur is present.  Pulmonary/Chest: Effort normal and breath sounds normal.   Abdominal: Abdomen is soft and flat. Bowel sounds are normal. There is no abdominal tenderness. No hernia.     Lymphadenopathy:     She has no cervical adenopathy.     She has no axillary adenopathy.   Neurological: She is alert and oriented to person, place, and time. She has normal strength and normal reflexes. She displays normal reflexes. No cranial nerve deficit or sensory deficit. She displays a negative Romberg sign. GCS eye subscore is 4. GCS verbal subscore is 5. GCS motor subscore is 6.   Skin: Skin is warm, dry and intact. Capillary refill takes less than 2 seconds. No rash noted.   Psychiatric: She has a normal mood and affect. Her speech is normal and behavior is normal. Judgment and thought content normal. She is not actively hallucinating. Cognition and memory are normal. She is attentive.         Medical Screening Exam   See Full Note    ED Course   Procedures  Labs Reviewed - No data to display       Imaging Results    None          Medications - No data to display  Medical Decision Making  Patient is a 29 y/o female with a PMHx significant for Depression and Anxiety presents ambulatory via POV to the Emergency Department with c/o insomnia. Patient stated that she was recently discharged from the [facility]  in [place] for Depression and Anxiety. Patient stated that she has not been able to sleep at night since her discharge. Patient was receiving Seroquel while inpatient and discharged home on Trazodone.     The history is provided by the patient.       Risk  Prescription drug management.               ED Course as of 12/23/23 1204   Sat Dec 23, 2023   1200 Discharge instructions given along with strict return precautions, patient verbalizes understanding.   [AC]      ED Course User Index  [AC] Feng Mendoza FNP                           Clinical Impression:   Final diagnoses:  [G47.00] Insomnia, unspecified type (Primary)        ED Disposition Condition    Discharge Stable          ED Prescriptions       Medication Sig Dispense Start Date End Date Auth. Provider    QUEtiapine (SEROQUEL) 50 MG tablet Take 1 tablet (50 mg total) by mouth every evening. 30 tablet 12/23/2023 12/22/2024 Feng Mendoza FNP          Follow-up Information       Follow up With Specialties Details Why Contact Info    Psychiatrist  In 3 days for follow up              Feng Mendoza FNP  12/23/23 1202

## 2023-12-23 NOTE — ED TRIAGE NOTES
Presents to ed per pov c/o unable to sleep at hs x 1 week. Taking 2 medications now but stated the are not helping. Reported she tried to go to [facility] and get medications changed but was unable to get an appointment before Friday.

## 2024-02-07 ENCOUNTER — HOSPITAL ENCOUNTER (EMERGENCY)
Facility: HOSPITAL | Age: 29
Discharge: HOME | End: 2024-02-07

## 2024-02-07 VITALS
SYSTOLIC BLOOD PRESSURE: 108 MMHG | WEIGHT: 144.81 LBS | BODY MASS INDEX: 24.12 KG/M2 | TEMPERATURE: 98 F | RESPIRATION RATE: 16 BRPM | DIASTOLIC BLOOD PRESSURE: 77 MMHG | OXYGEN SATURATION: 98 % | HEIGHT: 65 IN | HEART RATE: 83 BPM

## 2024-02-07 DIAGNOSIS — N93.9 VAGINA BLEEDING: Primary | ICD-10-CM

## 2024-02-07 PROCEDURE — 99283 EMERGENCY DEPT VISIT LOW MDM: CPT | Mod: ,,, | Performed by: NURSE PRACTITIONER

## 2024-02-07 PROCEDURE — 99281 EMR DPT VST MAYX REQ PHY/QHP: CPT

## 2024-02-07 NOTE — ED PROVIDER NOTES
"Encounter Date: 2024       History     Chief Complaint   Patient presents with    Weakness     Pt reports being 6 weeks pregnant.  Has had light spotting.  Has appointment in [place] next week for .  Pt reports didn't feel like going to work today and wants a work excuse.     The history is provided by the patient.     Review of patient's allergies indicates:  No Known Allergies  Past Medical History:   Diagnosis Date    Anxiety disorder, unspecified     Fibroids     Insomnia      Past Surgical History:   Procedure Laterality Date     SECTION       No family history on file.  Social History     Tobacco Use    Smoking status: Every Day     Types: Cigarettes, Vaping with nicotine    Smokeless tobacco: Current   Substance Use Topics    Alcohol use: Yes     Comment: socially    Drug use: Yes     Frequency: 1.0 times per week     Types: "Crack" cocaine     Comment: started last night     Review of Systems   Genitourinary:  Positive for vaginal bleeding.   All other systems reviewed and are negative.      Physical Exam     Initial Vitals [24 1721]   BP Pulse Resp Temp SpO2   (!) 103/51 83 16 98.2 °F (36.8 °C) 98 %      MAP       --         Physical Exam    Constitutional: She appears well-developed and well-nourished.   HENT:   Head: Normocephalic.   Right Ear: External ear normal.   Left Ear: External ear normal.   Mouth/Throat: Oropharynx is clear and moist.   Eyes: Pupils are equal, round, and reactive to light.   Neck:   Normal range of motion.  Cardiovascular:  Normal rate and regular rhythm.           Pulmonary/Chest: Breath sounds normal.   Abdominal: Abdomen is soft.   Musculoskeletal:         General: Normal range of motion.      Cervical back: Normal range of motion.     Neurological: She is alert and oriented to person, place, and time.   Skin: Skin is warm.         Medical Screening Exam   See Full Note    ED Course   Procedures  Labs Reviewed - No data to display       Imaging " Results    None          Medications - No data to display  Medical Decision Making  Pt reports being 6 weeks pregnant.  Has had light spotting.  Has appointment in [place] next week for .  Pt reports didn't feel like going to work today and wants a work excuse.                                       Clinical Impression:   Final diagnoses:  [N93.9] Vagina bleeding (Primary)        ED Disposition Condition    Discharge Stable          ED Prescriptions    None       Follow-up Information       Follow up With Specialties Details Why Contact Info    pcp   As needed              Dav Guadarrama, SHILO  24 [id number]       Dav Guadarrama, SHILO  24 1736

## 2024-02-07 NOTE — Clinical Note
"Lucinda Slaterle" Odin was seen and treated in our emergency department on 2/7/2024.  She may return to work on 02/08/2024.       If you have any questions or concerns, please don't hesitate to call.      Dav Guadarrama, FNP"

## 2024-02-07 NOTE — ED TRIAGE NOTES
"28 year old wnwd female presents to ER requesting work excuse stating " I just don't feel good".  Pt denies any pain or discomfort. Pt states she is nauseous but pt is eating a burger and drinking a shake.   Pt is AA&O x4, All vs are wnl, NAD noted.Pt states she is about six weeks pregnant but is having an  next week.  "

## 2024-08-06 ENCOUNTER — HOSPITAL ENCOUNTER (EMERGENCY)
Facility: HOSPITAL | Age: 29
Discharge: HOME | End: 2024-08-06

## 2024-08-06 VITALS
RESPIRATION RATE: 16 BRPM | TEMPERATURE: 97 F | WEIGHT: 137 LBS | DIASTOLIC BLOOD PRESSURE: 78 MMHG | BODY MASS INDEX: 22.02 KG/M2 | OXYGEN SATURATION: 98 % | SYSTOLIC BLOOD PRESSURE: 100 MMHG | HEART RATE: 99 BPM | HEIGHT: 66 IN

## 2024-08-06 DIAGNOSIS — B34.9 VIRAL SYNDROME: Primary | ICD-10-CM

## 2024-08-06 DIAGNOSIS — Z20.822 EXPOSURE TO COVID-19 VIRUS: ICD-10-CM

## 2024-08-06 LAB
INFLUENZA A MOLECULAR (OHS): NEGATIVE
INFLUENZA B MOLECULAR (OHS): NEGATIVE
SARS-COV-2 RDRP RESP QL NAA+PROBE: NEGATIVE

## 2024-08-06 PROCEDURE — 87502 INFLUENZA DNA AMP PROBE: CPT | Performed by: NURSE PRACTITIONER

## 2024-08-06 PROCEDURE — 99284 EMERGENCY DEPT VISIT MOD MDM: CPT | Mod: ,,, | Performed by: NURSE PRACTITIONER

## 2024-08-06 PROCEDURE — 99282 EMERGENCY DEPT VISIT SF MDM: CPT

## 2024-08-06 PROCEDURE — 87635 SARS-COV-2 COVID-19 AMP PRB: CPT | Performed by: NURSE PRACTITIONER

## 2024-08-06 NOTE — Clinical Note
"Lucinda"Laura Newby was seen and treated in our emergency department on 8/6/2024.     COVID-19 is present in our communities across the state. There is limited testing for COVID at this time, so not all patients can be tested. In this situation, your employee meets the following criteria:    Lucinda Newby has met the criteria for COVID-19 testing and has a NEGATIVE result. The employee can return to work once they are asymptomatic for 24 hours without the use of fever reducing medications (Tylenol, Motrin, etc).     If the employee is not fully vaccinated and had a close contact:  · Retest at 5 to 7 days post-exposure  · If possible, it is recommended that they quarantine for 5 days from the time of contact regardless of their test status.  · A mask should be worn post quarantine for 5 days.    If you have any questions or concerns, or if I can be of further assistance, please do not hesitate to contact me.    Sincerely,             Regina Dotson FNP"

## 2024-08-07 NOTE — ED PROVIDER NOTES
"Encounter Date: 2024       History     Chief Complaint   Patient presents with    Generalized Body Aches     28 yo female with c/o generalized body aches and loss of taste since AM. Denies fever, sore throat, otalgia.    The history is provided by the patient.     Review of patient's allergies indicates:  No Known Allergies  Past Medical History:   Diagnosis Date    Anxiety disorder, unspecified     Fibroids     Insomnia      Past Surgical History:   Procedure Laterality Date     SECTION       No family history on file.  Social History     Tobacco Use    Smoking status: Every Day     Types: Cigarettes, Vaping with nicotine    Smokeless tobacco: Current   Substance Use Topics    Alcohol use: Not Currently     Comment: socially    Drug use: Yes     Frequency: 1.0 times per week     Types: "Crack" cocaine     Comment: started last night     Review of Systems   Constitutional:  Negative for chills and fever.   HENT:  Negative for congestion, sinus pressure, sinus pain and sore throat.    Respiratory:  Negative for cough, shortness of breath and wheezing.    Cardiovascular:  Negative for chest pain, palpitations and leg swelling.   Gastrointestinal:  Negative for abdominal pain, nausea and vomiting.   Musculoskeletal:  Positive for myalgias.   Neurological:  Negative for dizziness, weakness and headaches.   Psychiatric/Behavioral:  Negative for confusion.    All other systems reviewed and are negative.      Physical Exam     Initial Vitals [24 2217]   BP Pulse Resp Temp SpO2   100/78 99 16 96.8 °F (36 °C) 98 %      MAP       --         Physical Exam    Nursing note and vitals reviewed.  Constitutional: She appears well-developed and well-nourished.   HENT:   Head: Normocephalic and atraumatic.   Right Ear: External ear normal.   Left Ear: External ear normal.   Nose: Nose normal.   Mouth/Throat: Oropharynx is clear and moist.   Eyes: EOM are normal. Pupils are equal, round, and reactive to light. "   Neck: Neck supple.   Normal range of motion.  Cardiovascular:  Normal rate, regular rhythm and normal heart sounds.           Pulmonary/Chest: Breath sounds normal.   Abdominal: Abdomen is soft. She exhibits no distension. There is no abdominal tenderness.   Musculoskeletal:         General: Normal range of motion.      Cervical back: Normal range of motion and neck supple.     Neurological: She is alert and oriented to person, place, and time. She has normal strength. GCS score is 15. GCS eye subscore is 4. GCS verbal subscore is 5. GCS motor subscore is 6.   Skin: Skin is warm. Capillary refill takes less than 2 seconds.   Psychiatric: She has a normal mood and affect. Her behavior is normal. Judgment and thought content normal.         Medical Screening Exam   See Full Note    ED Course   Procedures  Labs Reviewed   INFLUENZA A & B BY MOLECULAR - Normal       Result Value    INFLUENZA A MOLECULAR Negative      INFLUENZA B MOLECULAR  Negative     SARS-COV-2 RNA AMPLIFICATION, QUAL - Normal    SARS COV-2 Molecular Negative      Narrative:     Negative SARS-CoV results should not be used as the sole basis for treatment or patient management decisions; negative results should be considered in the context of a patient's recent exposures, history and the presene of clinical signs and symptoms consistent with COVID-19.  Negative results should be treated as presumptive and confirmed by molecular assay, if necessary for patient management.          Imaging Results    None          Medications - No data to display  Medical Decision Making  28 yo female with c/o generalized body aches and loss of taste since AM. Denies fever, sore throat, otalgia.    The history is provided by the patient.   Vitals reviewed  Covid/Flu negative  Strict return and follow-up precautions have been given by me personally to the patient/family/caregiver(s).    Data Reviewed/Counseling: I have reviewed the patient's vital signs, nursing notes,  and other relevant tests/information. I had a detailed discussion regarding the historical points, exam findings, and any diagnostic results supporting the discharge diagnosis. I also discussed the need for outpatient follow-up and the need to return to the ED if symptoms worsen or if there are any questions or concerns that arise at home.      Amount and/or Complexity of Data Reviewed  Labs: ordered. Decision-making details documented in ED Course.                                      Clinical Impression:   Final diagnoses:  [B34.9] Viral syndrome (Primary)  [Z20.822] Exposure to COVID-19 virus        ED Disposition Condition    Discharge Stable          ED Prescriptions    None       Follow-up Information    None          Regina Dotson, SHILO  08/06/24 1047

## 2024-08-07 NOTE — ED TRIAGE NOTES
Pt presents with c/o generalized body aches and not being able to taste food. Symptoms started this AM.

## 2024-08-07 NOTE — DISCHARGE INSTRUCTIONS
I suggest getting retested if symptoms continue. Follow up with primary care provider.     The examination and treatment you have received in the Emergency Department today have been rendered on an emergency basis only and are not intended to be a substitute for an effort to provide complete medical care. You should contact your follow-up physician as it is important that you let him or her check you and report any new or remaining problems since it is impossible to recognize and treat all elements of an injury or illness in a single emergency care center visit.

## 2025-01-20 ENCOUNTER — HOSPITAL ENCOUNTER (EMERGENCY)
Facility: HOSPITAL | Age: 30
Discharge: HOME | End: 2025-01-20
Payer: MEDICAID

## 2025-01-20 VITALS
OXYGEN SATURATION: 100 % | HEIGHT: 65 IN | SYSTOLIC BLOOD PRESSURE: 96 MMHG | DIASTOLIC BLOOD PRESSURE: 63 MMHG | HEART RATE: 73 BPM | WEIGHT: 138.38 LBS | RESPIRATION RATE: 18 BRPM | TEMPERATURE: 99 F | BODY MASS INDEX: 23.06 KG/M2

## 2025-01-20 DIAGNOSIS — J06.9 UPPER RESPIRATORY TRACT INFECTION, UNSPECIFIED TYPE: Primary | ICD-10-CM

## 2025-01-20 PROCEDURE — 99281 EMR DPT VST MAYX REQ PHY/QHP: CPT

## 2025-01-20 PROCEDURE — 99282 EMERGENCY DEPT VISIT SF MDM: CPT | Mod: ,,, | Performed by: NURSE PRACTITIONER

## 2025-01-20 NOTE — ED PROVIDER NOTES
"Encounter Date: 2025       History     Chief Complaint   Patient presents with    Cough    Sore Throat     Body aches     Patient presents today with complaint of cough and congestion.  States symptoms began yesterday and got progressively worse overnight.  Cough is nonproductive.  Subjective fever with aches and chills.  Has had no complaints of chest pain or shortness of breath.        Review of patient's allergies indicates:  No Known Allergies  Past Medical History:   Diagnosis Date    Anxiety disorder, unspecified     Fibroids     Insomnia      Past Surgical History:   Procedure Laterality Date     SECTION       No family history on file.  Social History     Tobacco Use    Smoking status: Every Day     Types: Cigarettes, Vaping with nicotine    Smokeless tobacco: Current   Substance Use Topics    Alcohol use: Not Currently     Comment: socially    Drug use: Yes     Frequency: 1.0 times per week     Types: "Crack" cocaine     Comment: started last night     Review of Systems   Constitutional:  Positive for chills.   Respiratory:  Positive for cough. Negative for shortness of breath.    Cardiovascular:  Negative for chest pain.   All other systems reviewed and are negative.      Physical Exam     Initial Vitals [25 1212]   BP Pulse Resp Temp SpO2   96/63 73 18 98.5 °F (36.9 °C) 100 %      MAP       --         Physical Exam    Nursing note and vitals reviewed.  Constitutional: She appears well-developed and well-nourished.   Cardiovascular:  Normal rate, regular rhythm and normal heart sounds.           No murmur heard.  Pulmonary/Chest: Breath sounds normal. No respiratory distress. She has no wheezes. She has no rhonchi. She has no rales. She exhibits no tenderness.   Musculoskeletal:         General: No tenderness or edema. Normal range of motion.     Neurological: She is alert and oriented to person, place, and time. She has normal strength. GCS score is 15. GCS eye subscore is 4. GCS verbal " subscore is 5. GCS motor subscore is 6.   Skin: Skin is warm and dry.         Medical Screening Exam   See Full Note    ED Course   Procedures  Labs Reviewed - No data to display       Imaging Results    None          Medications - No data to display  Medical Decision Making             ED Course as of 01/20/25 1216   Mon Jan 20, 2025   1212 Patient with complaint of cough and congestion.  Reports her kids had a upper respiratory infection last week.  She believes she is contracted with they had.  She has no complaints chest pain or shortness of breath.  Mostly having body aches, chills, cough and generalized malaise.  Her exam is unremarkable with her lungs clear to auscultation.  Her vital signs are stable we will discharge her home with close PCP follow up.  I have instructed her to use Tylenol and ibuprofen along with pushing fluids [BC]      ED Course User Index  [BC] Titi Hawkins NP                           Clinical Impression:   Final diagnoses:  [J06.9] Upper respiratory tract infection, unspecified type (Primary)        ED Disposition Condition    Discharge Stable          ED Prescriptions    None       Follow-up Information    None          Titi Hawkins NP  01/20/25 1216

## 2025-01-20 NOTE — DISCHARGE INSTRUCTIONS
Follow up with your primary care provider in 2-3 days.  Drink plenty of fluids to stay hydrated.  You may use Tylenol and ibuprofen as needed for fever aches and pains.  Return to the emergency department for any worsening of condition or any concerns

## 2025-01-20 NOTE — ED TRIAGE NOTES
29 year old wnwd female presents to ER with c/o achy all over, sore throat and cough. Pt states she thinks she has the flu. Pt started feeling bad yesterday. All vs are wnl. Pt is AA&O x 4.